# Patient Record
Sex: FEMALE
[De-identification: names, ages, dates, MRNs, and addresses within clinical notes are randomized per-mention and may not be internally consistent; named-entity substitution may affect disease eponyms.]

---

## 2022-12-23 PROBLEM — Z00.00 ENCOUNTER FOR PREVENTIVE HEALTH EXAMINATION: Status: ACTIVE | Noted: 2022-12-23

## 2022-12-27 ENCOUNTER — APPOINTMENT (OUTPATIENT)
Dept: OBGYN | Facility: CLINIC | Age: 62
End: 2022-12-27

## 2022-12-27 VITALS
DIASTOLIC BLOOD PRESSURE: 76 MMHG | BODY MASS INDEX: 31.95 KG/M2 | HEIGHT: 61 IN | WEIGHT: 169.2 LBS | SYSTOLIC BLOOD PRESSURE: 112 MMHG

## 2022-12-27 DIAGNOSIS — Z86.79 PERSONAL HISTORY OF OTHER DISEASES OF THE CIRCULATORY SYSTEM: ICD-10-CM

## 2022-12-27 DIAGNOSIS — Z86.39 PERSONAL HISTORY OF OTHER ENDOCRINE, NUTRITIONAL AND METABOLIC DISEASE: ICD-10-CM

## 2022-12-27 DIAGNOSIS — Z78.9 OTHER SPECIFIED HEALTH STATUS: ICD-10-CM

## 2022-12-27 DIAGNOSIS — Z82.49 FAMILY HISTORY OF ISCHEMIC HEART DISEASE AND OTHER DISEASES OF THE CIRCULATORY SYSTEM: ICD-10-CM

## 2022-12-27 DIAGNOSIS — N90.5 ATROPHY OF VULVA: ICD-10-CM

## 2022-12-27 DIAGNOSIS — N95.2 POSTMENOPAUSAL ATROPHIC VAGINITIS: ICD-10-CM

## 2022-12-27 PROCEDURE — 99386 PREV VISIT NEW AGE 40-64: CPT

## 2022-12-27 NOTE — HISTORY OF PRESENT ILLNESS
[N] : Patient is not sexually active [Y] : Positive pregnancy history [Menarche Age: ____] : age at menarche was [unfilled] [Currently In Menopause] : currently in menopause [Menopause Age: ____] : age at menopause was [unfilled] [PGHxTotal] : 4 [[facility] : [facility] [PGHxPremature] : 0 [PGHxAbortions] : 0 [[facility]] : 4 [PGHxABInduced] : 0 [PGHxABSpont] : 0 [PGHxEctopic] : 0 [PGHxMultBirths] : 0

## 2022-12-27 NOTE — PHYSICAL EXAM
[Chaperone Present] : A chaperone was present in the examining room during all aspects of the physical examination [FreeTextEntry1] : Gillian [Appropriately responsive] : appropriately responsive [Alert] : alert [No Acute Distress] : no acute distress [No Lymphadenopathy] : no lymphadenopathy [Regular Rate Rhythm] : regular rate rhythm [No Murmurs] : no murmurs [Clear to Auscultation B/L] : clear to auscultation bilaterally [Soft] : soft [Non-tender] : non-tender [Non-distended] : non-distended [No HSM] : No HSM [No Lesions] : no lesions [No Mass] : no mass [Oriented x3] : oriented x3 [FreeTextEntry2] : Bilateral leg varicosities noted [Examination Of The Breasts] : a normal appearance [No Masses] : no breast masses were palpable [Vulvar Atrophy] : vulvar atrophy [Labia Majora] : normal [Labia Minora] : normal [Atrophy] : atrophy [Normal] : normal [Uterine Adnexae] : normal

## 2022-12-27 NOTE — COUNSELING
[Nutrition/ Exercise/ Weight Management] : nutrition, exercise, weight management [Body Image] : body image [Vitamins/Supplements] : vitamins/supplements [Sunscreen] : sunscreen [Drugs/Alcohol] : drugs, alcohol [Breast Self Exam] : breast self exam [Bladder Hygiene] : bladder hygiene [Confidentiality] : confidentiality [Vaccines] : vaccines [HPV Vaccine] : HPV Vaccine

## 2022-12-28 LAB
C TRACH RRNA SPEC QL NAA+PROBE: NOT DETECTED
HPV HIGH+LOW RISK DNA PNL CVX: NOT DETECTED
N GONORRHOEA RRNA SPEC QL NAA+PROBE: NOT DETECTED
SOURCE TP AMPLIFICATION: NORMAL

## 2023-01-09 LAB — CYTOLOGY CVX/VAG DOC THIN PREP: ABNORMAL

## 2023-01-31 ENCOUNTER — OFFICE (OUTPATIENT)
Dept: URBAN - METROPOLITAN AREA CLINIC 112 | Facility: CLINIC | Age: 63
Setting detail: OPHTHALMOLOGY
End: 2023-01-31
Payer: COMMERCIAL

## 2023-01-31 DIAGNOSIS — E11.3293: ICD-10-CM

## 2023-01-31 DIAGNOSIS — H35.033: ICD-10-CM

## 2023-01-31 DIAGNOSIS — H25.13: ICD-10-CM

## 2023-01-31 DIAGNOSIS — H11.153: ICD-10-CM

## 2023-01-31 PROBLEM — H52.4 PRESBYOPIA ; BOTH EYES: Status: ACTIVE | Noted: 2023-01-31

## 2023-01-31 PROBLEM — H04.123 DRY EYE SYNDROME LACRIMAL GLAND; BOTH EYES: Status: ACTIVE | Noted: 2023-01-31

## 2023-01-31 PROCEDURE — 92250 FUNDUS PHOTOGRAPHY W/I&R: CPT | Performed by: OPHTHALMOLOGY

## 2023-01-31 PROCEDURE — 92004 COMPRE OPH EXAM NEW PT 1/>: CPT | Performed by: OPHTHALMOLOGY

## 2023-01-31 ASSESSMENT — REFRACTION_MANIFEST
OS_AXIS: 090
OD_VA1: 20/25
OS_CYLINDER: -0.50
OD_ADD: +2.25
OD_SPHERE: -0.50
OS_VA1: 20/25
OS_ADD: +2.25
OS_SPHERE: PLANO

## 2023-01-31 ASSESSMENT — TONOMETRY
OS_IOP_MMHG: 16
OD_IOP_MMHG: 16

## 2023-01-31 ASSESSMENT — REFRACTION_AUTOREFRACTION
OD_AXIS: 099
OS_SPHERE: +0.50
OD_SPHERE: +0.25
OD_CYLINDER: -0.50
OS_AXIS: 074
OS_CYLINDER: -0.75

## 2023-01-31 ASSESSMENT — SPHEQUIV_DERIVED
OD_SPHEQUIV: 0
OS_SPHEQUIV: 0.125

## 2023-01-31 ASSESSMENT — CONFRONTATIONAL VISUAL FIELD TEST (CVF)
OD_FINDINGS: FULL
OS_FINDINGS: FULL

## 2023-03-10 ENCOUNTER — OFFICE (OUTPATIENT)
Dept: URBAN - METROPOLITAN AREA CLINIC 94 | Facility: CLINIC | Age: 63
Setting detail: OPHTHALMOLOGY
End: 2023-03-10
Payer: COMMERCIAL

## 2023-03-10 DIAGNOSIS — H35.033: ICD-10-CM

## 2023-03-10 DIAGNOSIS — H35.373: ICD-10-CM

## 2023-03-10 DIAGNOSIS — H25.12: ICD-10-CM

## 2023-03-10 DIAGNOSIS — H25.13: ICD-10-CM

## 2023-03-10 DIAGNOSIS — H25.043: ICD-10-CM

## 2023-03-10 DIAGNOSIS — E11.3293: ICD-10-CM

## 2023-03-10 PROBLEM — H25.11 CATARACT SENILE NUCLEAR SCLEROSIS; RIGHT EYE, LEFT EYE, BOTH EYES: Status: ACTIVE | Noted: 2023-03-10

## 2023-03-10 PROCEDURE — 99213 OFFICE O/P EST LOW 20 MIN: CPT | Performed by: OPHTHALMOLOGY

## 2023-03-10 PROCEDURE — 92235 FLUORESCEIN ANGRPH MLTIFRAME: CPT | Performed by: OPHTHALMOLOGY

## 2023-03-10 PROCEDURE — 92136 OPHTHALMIC BIOMETRY: CPT | Performed by: OPHTHALMOLOGY

## 2023-03-10 PROCEDURE — 92134 CPTRZ OPH DX IMG PST SGM RTA: CPT | Performed by: OPHTHALMOLOGY

## 2023-03-10 ASSESSMENT — KERATOMETRY
OD_K1POWER_DIOPTERS: 43.25
OS_K2POWER_DIOPTERS: 44.00
OS_K1POWER_DIOPTERS: 43.50
OS_AXISANGLE_DEGREES: 170
OD_K2POWER_DIOPTERS: 43.50
OD_AXISANGLE_DEGREES: 174

## 2023-03-10 ASSESSMENT — REFRACTION_MANIFEST
OS_AXIS: 090
OD_ADD: +2.25
OD_SPHERE: -0.50
OS_SPHERE: PLANO
OS_VA1: 20/25
OS_ADD: +2.25
OS_CYLINDER: -0.50
OD_VA1: 20/25

## 2023-03-10 ASSESSMENT — REFRACTION_AUTOREFRACTION
OS_SPHERE: +0.25
OS_AXIS: 079
OD_CYLINDER: -0.50
OD_SPHERE: +0.25
OD_AXIS: 069
OS_CYLINDER: -0.50

## 2023-03-10 ASSESSMENT — AXIALLENGTH_DERIVED
OS_AL: 23.5004
OD_AL: 23.6379

## 2023-03-10 ASSESSMENT — VISUAL ACUITY
OS_BCVA: 20/50
OD_BCVA: 20/50

## 2023-03-10 ASSESSMENT — CONFRONTATIONAL VISUAL FIELD TEST (CVF)
OS_FINDINGS: FULL
OD_FINDINGS: FULL

## 2023-03-10 ASSESSMENT — SPHEQUIV_DERIVED
OD_SPHEQUIV: 0
OS_SPHEQUIV: 0

## 2023-03-10 ASSESSMENT — TONOMETRY
OS_IOP_MMHG: 15
OD_IOP_MMHG: 16

## 2024-07-29 ENCOUNTER — OFFICE (OUTPATIENT)
Dept: URBAN - METROPOLITAN AREA CLINIC 112 | Facility: CLINIC | Age: 64
Setting detail: OPHTHALMOLOGY
End: 2024-07-29
Payer: COMMERCIAL

## 2024-07-29 DIAGNOSIS — H35.373: ICD-10-CM

## 2024-07-29 DIAGNOSIS — H11.153: ICD-10-CM

## 2024-07-29 DIAGNOSIS — E11.3292: ICD-10-CM

## 2024-07-29 DIAGNOSIS — H25.043: ICD-10-CM

## 2024-07-29 DIAGNOSIS — H35.033: ICD-10-CM

## 2024-07-29 DIAGNOSIS — H18.413: ICD-10-CM

## 2024-07-29 DIAGNOSIS — H25.13: ICD-10-CM

## 2024-07-29 DIAGNOSIS — H25.11: ICD-10-CM

## 2024-07-29 DIAGNOSIS — H04.121: ICD-10-CM

## 2024-07-29 DIAGNOSIS — H25.12: ICD-10-CM

## 2024-07-29 DIAGNOSIS — E11.3291: ICD-10-CM

## 2024-07-29 DIAGNOSIS — H04.122: ICD-10-CM

## 2024-07-29 PROCEDURE — 92014 COMPRE OPH EXAM EST PT 1/>: CPT | Performed by: OPHTHALMOLOGY

## 2024-07-29 PROCEDURE — 83861 MICROFLUID ANALY TEARS: CPT | Mod: RT | Performed by: OPHTHALMOLOGY

## 2024-07-29 PROCEDURE — 83861 MICROFLUID ANALY TEARS: CPT | Mod: LT | Performed by: OPHTHALMOLOGY
